# Patient Record
(demographics unavailable — no encounter records)

---

## 2024-10-23 NOTE — PLAN
[FreeTextEntry1] : follow up 4 months for post partum check up  Keep wound area clean and dry  May use abdominal binder  Avoid heavy lifting or strenuous activities x 2 months from surgery  ER warnings given

## 2024-10-23 NOTE — PHYSICAL EXAM
[Appropriately responsive] : appropriately responsive [Alert] : alert [No Acute Distress] : no acute distress [No Lymphadenopathy] : no lymphadenopathy [Regular Rate Rhythm] : regular rate rhythm [No Murmurs] : no murmurs [Clear to Auscultation B/L] : clear to auscultation bilaterally [Soft] : soft [Non-tender] : non-tender [Non-distended] : non-distended [No HSM] : No HSM [No Lesions] : no lesions [No Mass] : no mass [Oriented x3] : oriented x3 [FreeTextEntry7] : incision closed and healed

## 2025-07-08 NOTE — SIGNATURES
[TextEntry] : Dominique Singh D.O.  Family Medicine Physician [place] [address] [phone]

## 2025-07-08 NOTE — SIGNATURES
[TextEntry] : Dominique Singh D.O.  Family Medicine [place] [address] [phone]

## 2025-07-08 NOTE — REVIEW OF SYSTEMS
[Skin Rash] : no skin rash [Headache] : no headache [Depression] : no depression [Swollen Glands] : no swollen glands [Negative] : Gastrointestinal

## 2025-07-08 NOTE — HISTORY OF PRESENT ILLNESS
[FreeTextEntry1] : follow-up [de-identified] : Ms. CONSTANTINE SANCHEZ is a 37 year old female who presents to the office for follow-up BP elevated, pt is on labetalol 300mg.  Highest /92.  Pt is not breastfeeding.  Does not do well with amlodipine. Pt would like to discuss possible GLP-1 injectables for weight loss.

## 2025-07-08 NOTE — HISTORY OF PRESENT ILLNESS
[FreeTextEntry1] : follow-up [de-identified] : Ms. CONSTANTINE SANCHEZ is a 37 year old female who presents to the office for follow-up BP elevated, pt is on labetalol 300mg.  Highest /92.  Pt is not breastfeeding.  Does not do well with amlodipine. Pt would like to discuss possible GLP-1 injectables for weight loss.

## 2025-07-08 NOTE — HISTORY OF PRESENT ILLNESS
[FreeTextEntry1] : follow-up [de-identified] : Ms. CONSTANTINE SANCHEZ is a 37 year old female who presents to the office for follow-up BP elevated, pt is on labetalol 300mg.  Highest /92.  Pt is not breastfeeding.  Does not do well with amlodipine. Pt would like to discuss possible GLP-1 injectables for weight loss.

## 2025-07-08 NOTE — SIGNATURES
[TextEntry] : Dominique Singh D.O.  Family Medicine [place] [address] [phone]

## 2025-07-22 NOTE — SIGNATURES
[TextEntry] : Dominique Singh D.O.  Family Medicine [place]ician [place] [address] [phone]

## 2025-07-22 NOTE — SIGNATURES
[TextEntry] : Dominique Singh D.O.  Family Medicine [place] [address] [phone]

## 2025-07-22 NOTE — HISTORY OF PRESENT ILLNESS
[FreeTextEntry1] : follow-up  [de-identified] : Ms. CONSTANTINE SANCHEZ is a 37 year old female who presents to the office for follow-up BP  Trial of losartan 50mg- admit to mild nausea brief period.  BP readings: 11am 157/103 (before medication)-----> after 139/90 166/101, 149/104 (after medication) Reports HA at night time, she will monitor BP at night too.   Does admit to HA at night.  Reviewed and discussed labs results,.   Thyroid scan at [facility] yesterday, will obtain results.

## 2025-07-22 NOTE — HISTORY OF PRESENT ILLNESS
[FreeTextEntry1] : follow-up  [de-identified] : Ms. CONSTANTINE SANCHEZ is a 37 year old female who presents to the office for follow-up BP  Trial of losartan 50mg- admit to mild nausea brief period.  BP readings: 11am 157/103 (before medication)-----> after 139/90 166/101, 149/104 (after medication) Reports HA at night time, she will monitor BP at night too.   Does admit to HA at night.  Reviewed and discussed labs results,.   Thyroid scan at [facility] yesterday, will obtain results.

## 2025-07-29 NOTE — SIGNATURES
[TextEntry] : Dominique Singh D.O.  Family Medicine Physician [place] [address] [phone]

## 2025-07-29 NOTE — SIGNATURES
[TextEntry] : Dominique Singh D.O.  Family Medicine [place] [address] [phone]

## 2025-07-29 NOTE — HISTORY OF PRESENT ILLNESS
[FreeTextEntry1] : BP follow-up [de-identified] : Verbal consented obtained for telehealth visit (2 way audio/visual visit)  Patient located at home.  Provider located at medical office ([place], [place])  Follow up BP, medications adjust at last visit, increased to 75mg losartan in AM and 25mg at PM BP  right now, 165/100, HR 96 BP in /98  /103 HR 76 in AM , 139/90 afternoon   Average /90s, Reviewed and discussed thyroid sono.- unremarkable, Discussed zepbound medication to aide in weight loss,

## 2025-07-29 NOTE — SIGNATURES
[TextEntry] : Dominique Singh D.O.  Family Medicine [place] [address] [phone]

## 2025-07-29 NOTE — HISTORY OF PRESENT ILLNESS
[FreeTextEntry1] : BP follow-up [de-identified] : Verbal consented obtained for telehealth visit (2 way audio/visual visit)  Patient located at home.  Provider located at medical office ([place], [place])  Follow up BP, medications adjust at last visit, increased to 75mg losartan in AM and 25mg at PM BP  right now, 165/100, HR 96 BP in /98  /103 HR 76 in AM , 139/90 afternoon   Average /90s, Reviewed and discussed thyroid sono.- unremarkable, Discussed zepbound medication to aide in weight loss,

## 2025-07-29 NOTE — HISTORY OF PRESENT ILLNESS
[FreeTextEntry1] : BP follow-up [de-identified] : Verbal consented obtained for telehealth visit (2 way audio/visual visit)  Patient located at home.  Provider located at medical office ([place], [place])  Follow up BP, medications adjust at last visit, increased to 75mg losartan in AM and 25mg at PM BP  right now, 165/100, HR 96 BP in /98  /103 HR 76 in AM , 139/90 afternoon   Average /90s, Reviewed and discussed thyroid sono.- unremarkable, Discussed zepbound medication to aide in weight loss,